# Patient Record
Sex: FEMALE | Race: WHITE | NOT HISPANIC OR LATINO
[De-identification: names, ages, dates, MRNs, and addresses within clinical notes are randomized per-mention and may not be internally consistent; named-entity substitution may affect disease eponyms.]

---

## 2019-09-27 PROBLEM — Z00.00 ENCOUNTER FOR PREVENTIVE HEALTH EXAMINATION: Status: ACTIVE | Noted: 2019-09-27

## 2019-10-24 ENCOUNTER — APPOINTMENT (OUTPATIENT)
Dept: GASTROENTEROLOGY | Facility: CLINIC | Age: 54
End: 2019-10-24
Payer: COMMERCIAL

## 2019-10-24 VITALS
BODY MASS INDEX: 23.03 KG/M2 | HEART RATE: 69 BPM | HEIGHT: 61 IN | SYSTOLIC BLOOD PRESSURE: 117 MMHG | WEIGHT: 122 LBS | DIASTOLIC BLOOD PRESSURE: 75 MMHG

## 2019-10-24 DIAGNOSIS — K62.5 HEMORRHAGE OF ANUS AND RECTUM: ICD-10-CM

## 2019-10-24 DIAGNOSIS — F41.9 ANXIETY DISORDER, UNSPECIFIED: ICD-10-CM

## 2019-10-24 DIAGNOSIS — R30.9 PAINFUL MICTURITION, UNSPECIFIED: ICD-10-CM

## 2019-10-24 DIAGNOSIS — J34.9 UNSPECIFIED DISORDER OF NOSE AND NASAL SINUSES: ICD-10-CM

## 2019-10-24 DIAGNOSIS — K64.9 UNSPECIFIED HEMORRHOIDS: ICD-10-CM

## 2019-10-24 DIAGNOSIS — M25.50 PAIN IN UNSPECIFIED JOINT: ICD-10-CM

## 2019-10-24 DIAGNOSIS — Z12.11 ENCOUNTER FOR SCREENING FOR MALIGNANT NEOPLASM OF COLON: ICD-10-CM

## 2019-10-24 PROCEDURE — 99202 OFFICE O/P NEW SF 15 MIN: CPT

## 2019-10-27 PROBLEM — R30.9 PAINFUL URINATION: Status: ACTIVE | Noted: 2019-10-24

## 2019-10-27 PROBLEM — F41.9 ANXIETY: Status: ACTIVE | Noted: 2019-10-24

## 2019-10-27 PROBLEM — J34.9 SINUS PROBLEM: Status: ACTIVE | Noted: 2019-10-24

## 2019-10-27 PROBLEM — M25.50 JOINT PAIN: Status: ACTIVE | Noted: 2019-10-24

## 2019-10-27 PROBLEM — Z12.11 COLON CANCER SCREENING: Status: ACTIVE | Noted: 2019-10-24

## 2019-10-27 PROBLEM — K62.5 RECTAL BLEEDING: Status: ACTIVE | Noted: 2019-10-24

## 2019-10-27 PROBLEM — K64.9 HEMORRHOIDS, UNSPECIFIED HEMORRHOID TYPE: Status: ACTIVE | Noted: 2019-10-24

## 2019-10-27 NOTE — HISTORY OF PRESENT ILLNESS
[de-identified] : 55yo female presents for initial screening colonoscopy\par Patient presents prior to screening colonoscopy.  Patient notes scant rectal bleeding on paper wth BM sometimes.  She notes having hemorroihds which flare up intermittently.\par

## 2019-10-27 NOTE — REVIEW OF SYSTEMS
[As Noted in HPI] : as noted in HPI [Dysuria] : dysuria [Incontinence] : no incontinence [Pelvic Pain] : no pelvic pain [Dysmenorrhea] : no dysmenorrhea [Vaginal Discharge] : no vaginal discharge [Abn Vaginal Bleeding] : no unexplained vaginal bleeding [Arthralgias] : no arthralgias [Joint Pain] : joint pain [Joint Swelling] : no joint swelling [Joint Stiffness] : no joint stiffness [Limb Pain] : no limb pain [Limb Swelling] : no limb swelling [Negative] : Heme/Lymph

## 2019-10-27 NOTE — PHYSICAL EXAM
[General Appearance - Alert] : alert [General Appearance - In No Acute Distress] : in no acute distress [Sclera] : the sclera and conjunctiva were normal [PERRL With Normal Accommodation] : pupils were equal in size, round, and reactive to light [Extraocular Movements] : extraocular movements were intact [Outer Ear] : the ears and nose were normal in appearance [Oropharynx] : the oropharynx was normal [Neck Appearance] : the appearance of the neck was normal [Neck Cervical Mass (___cm)] : no neck mass was observed [Jugular Venous Distention Increased] : there was no jugular-venous distention [Thyroid Diffuse Enlargement] : the thyroid was not enlarged [Thyroid Nodule] : there were no palpable thyroid nodules [Auscultation Breath Sounds / Voice Sounds] : lungs were clear to auscultation bilaterally [Heart Rate And Rhythm] : heart rate was normal and rhythm regular [Heart Sounds] : normal S1 and S2 [Heart Sounds Gallop] : no gallops [Murmurs] : no murmurs [Heart Sounds Pericardial Friction Rub] : no pericardial rub [Full Pulse] : the pedal pulses are present [Edema] : there was no peripheral edema [Bowel Sounds] : normal bowel sounds [Abdomen Soft] : soft [Abdomen Tenderness] : non-tender [] : no hepato-splenomegaly [Abdomen Mass (___ Cm)] : no abdominal mass palpated [Abnormal Walk] : normal gait [Nail Clubbing] : no clubbing  or cyanosis of the fingernails [Musculoskeletal - Swelling] : no joint swelling seen [Motor Tone] : muscle strength and tone were normal [Oriented To Time, Place, And Person] : oriented to person, place, and time [Impaired Insight] : insight and judgment were intact [Affect] : the affect was normal

## 2019-11-15 ENCOUNTER — RESULT REVIEW (OUTPATIENT)
Age: 54
End: 2019-11-15

## 2019-11-15 ENCOUNTER — APPOINTMENT (OUTPATIENT)
Dept: GASTROENTEROLOGY | Facility: AMBULATORY MEDICAL SERVICES | Age: 54
End: 2019-11-15
Payer: COMMERCIAL

## 2019-11-15 PROCEDURE — 45380 COLONOSCOPY AND BIOPSY: CPT

## 2021-01-01 ENCOUNTER — OUTPATIENT (OUTPATIENT)
Dept: OUTPATIENT SERVICES | Facility: HOSPITAL | Age: 56
LOS: 1 days | End: 2021-01-01
Payer: COMMERCIAL

## 2021-01-01 DIAGNOSIS — Z20.828 CONTACT WITH AND (SUSPECTED) EXPOSURE TO OTHER VIRAL COMMUNICABLE DISEASES: ICD-10-CM

## 2021-01-01 LAB — SARS-COV-2 RNA SPEC QL NAA+PROBE: SIGNIFICANT CHANGE UP

## 2021-01-01 PROCEDURE — U0003: CPT

## 2021-01-01 PROCEDURE — C9803: CPT

## 2021-01-01 PROCEDURE — U0005: CPT

## 2021-01-02 DIAGNOSIS — Z20.828 CONTACT WITH AND (SUSPECTED) EXPOSURE TO OTHER VIRAL COMMUNICABLE DISEASES: ICD-10-CM

## 2021-01-06 ENCOUNTER — OUTPATIENT (OUTPATIENT)
Dept: OUTPATIENT SERVICES | Facility: HOSPITAL | Age: 56
LOS: 1 days | End: 2021-01-06

## 2021-01-06 DIAGNOSIS — Z20.828 CONTACT WITH AND (SUSPECTED) EXPOSURE TO OTHER VIRAL COMMUNICABLE DISEASES: ICD-10-CM

## 2021-01-06 LAB — SARS-COV-2 RNA SPEC QL NAA+PROBE: SIGNIFICANT CHANGE UP

## 2021-01-06 PROCEDURE — U0005: CPT

## 2021-01-06 PROCEDURE — C9803: CPT

## 2021-01-06 PROCEDURE — U0003: CPT

## 2021-01-07 DIAGNOSIS — Z20.828 CONTACT WITH AND (SUSPECTED) EXPOSURE TO OTHER VIRAL COMMUNICABLE DISEASES: ICD-10-CM

## 2021-05-24 DIAGNOSIS — Z01.818 ENCOUNTER FOR OTHER PREPROCEDURAL EXAMINATION: ICD-10-CM

## 2021-05-25 DIAGNOSIS — Z20.822 ENCOUNTER FOR PREPROCEDURAL LABORATORY EXAMINATION: ICD-10-CM

## 2021-05-25 DIAGNOSIS — Z01.812 ENCOUNTER FOR PREPROCEDURAL LABORATORY EXAMINATION: ICD-10-CM

## 2021-05-28 ENCOUNTER — APPOINTMENT (OUTPATIENT)
Dept: DISASTER EMERGENCY | Facility: CLINIC | Age: 56
End: 2021-05-28

## 2021-05-29 LAB — SARS-COV-2 N GENE NPH QL NAA+PROBE: NOT DETECTED

## 2021-06-01 ENCOUNTER — APPOINTMENT (OUTPATIENT)
Dept: INTERNAL MEDICINE | Facility: CLINIC | Age: 56
End: 2021-06-01
Payer: COMMERCIAL

## 2021-06-01 ENCOUNTER — NON-APPOINTMENT (OUTPATIENT)
Age: 56
End: 2021-06-01

## 2021-06-01 VITALS
SYSTOLIC BLOOD PRESSURE: 120 MMHG | RESPIRATION RATE: 18 BRPM | WEIGHT: 147 LBS | OXYGEN SATURATION: 97 % | BODY MASS INDEX: 27.75 KG/M2 | TEMPERATURE: 98.2 F | HEIGHT: 61 IN | DIASTOLIC BLOOD PRESSURE: 70 MMHG | HEART RATE: 81 BPM

## 2021-06-01 DIAGNOSIS — Z87.891 PERSONAL HISTORY OF NICOTINE DEPENDENCE: ICD-10-CM

## 2021-06-01 DIAGNOSIS — J98.11 ATELECTASIS: ICD-10-CM

## 2021-06-01 DIAGNOSIS — R06.00 DYSPNEA, UNSPECIFIED: ICD-10-CM

## 2021-06-01 PROCEDURE — 99205 OFFICE O/P NEW HI 60 MIN: CPT | Mod: 25

## 2021-06-01 PROCEDURE — 94727 GAS DIL/WSHOT DETER LNG VOL: CPT

## 2021-06-01 PROCEDURE — 94010 BREATHING CAPACITY TEST: CPT

## 2021-06-01 PROCEDURE — ZZZZZ: CPT

## 2021-06-01 PROCEDURE — 94729 DIFFUSING CAPACITY: CPT

## 2021-06-01 RX ORDER — UBIDECARENONE/VIT E ACET 100MG-5
CAPSULE ORAL
Refills: 0 | Status: ACTIVE | COMMUNITY

## 2021-06-01 RX ORDER — CHROMIUM 200 MCG
TABLET ORAL
Refills: 0 | Status: ACTIVE | COMMUNITY

## 2021-06-01 RX ORDER — SODIUM SULFATE, POTASSIUM SULFATE, MAGNESIUM SULFATE 17.5; 3.13; 1.6 G/ML; G/ML; G/ML
17.5-3.13-1.6 SOLUTION, CONCENTRATE ORAL
Qty: 1 | Refills: 0 | Status: DISCONTINUED | COMMUNITY
Start: 2019-10-24 | End: 2021-06-01

## 2021-06-01 NOTE — PROCEDURE
[FreeTextEntry1] : Complete pulmonary function tests show normal spirometry. FEV1 is 2.34 L which is 99% predicted. FVC is 3.20 L which is 100% predicted. FEV1/FC ratio 73%. Diffusing capacity is normal.

## 2021-06-01 NOTE — DISCUSSION/SUMMARY
[FreeTextEntry1] : Ms. Black is a 56-year-old female presents for initial pulmonary evaluation. She is complaining of shortness of breath when going up stairs or up a hill. I've explained this is most likely deconditioning of the "antigravity" muscle groups. She will continue to exercise on a regular basis. CT scan of the chest showed show some minor linear atelectasis bilaterally at the bases. There were no masses noted. Complete pulmonary function tests are normal. Patient does not require further radiologic followup. Ms. Black will follow up in this office on an as-needed basis.

## 2021-06-01 NOTE — HISTORY OF PRESENT ILLNESS
[TextBox_4] : Ms. Black is a 65-year-old female who presents for initial pulmonary evaluation. She is complaining of shortness of breath when going up stairs or going up an incline. Patient states that when she walks up a flight of stairs walks uphill she developed some shortness of breath. Symptoms do resolve with rest. She is able to engage in exercise and physical activity on a flat surface with no significant symptoms of shortness of breath. Ms. Black has no previous history of asthma or seasonal allergic rhinitis. She is a former smoker With a 20+ pack year history. The patient was sent for a CT scan of his chest by her primary care doctor which showed linear atelectasis at the lung bases bilaterally.

## 2021-07-29 ENCOUNTER — APPOINTMENT (OUTPATIENT)
Dept: NEUROLOGY | Facility: CLINIC | Age: 56
End: 2021-07-29

## 2021-07-29 ENCOUNTER — APPOINTMENT (OUTPATIENT)
Dept: NEUROLOGY | Facility: CLINIC | Age: 56
End: 2021-07-29
Payer: COMMERCIAL

## 2021-07-29 VITALS
DIASTOLIC BLOOD PRESSURE: 80 MMHG | TEMPERATURE: 97.3 F | BODY MASS INDEX: 25.3 KG/M2 | WEIGHT: 134 LBS | SYSTOLIC BLOOD PRESSURE: 120 MMHG | HEIGHT: 61 IN

## 2021-07-29 DIAGNOSIS — M79.606 PAIN IN LEG, UNSPECIFIED: ICD-10-CM

## 2021-07-29 DIAGNOSIS — R27.0 ATAXIA, UNSPECIFIED: ICD-10-CM

## 2021-07-29 PROCEDURE — 99204 OFFICE O/P NEW MOD 45 MIN: CPT

## 2021-08-18 ENCOUNTER — APPOINTMENT (OUTPATIENT)
Dept: NEUROLOGY | Facility: CLINIC | Age: 56
End: 2021-08-18

## 2021-08-25 ENCOUNTER — NON-APPOINTMENT (OUTPATIENT)
Age: 56
End: 2021-08-25

## 2021-10-29 ENCOUNTER — APPOINTMENT (OUTPATIENT)
Dept: UROLOGY | Facility: CLINIC | Age: 56
End: 2021-10-29
Payer: COMMERCIAL

## 2021-10-29 VITALS
SYSTOLIC BLOOD PRESSURE: 100 MMHG | BODY MASS INDEX: 25.3 KG/M2 | OXYGEN SATURATION: 95 % | TEMPERATURE: 97.3 F | RESPIRATION RATE: 17 BRPM | HEART RATE: 79 BPM | HEIGHT: 61 IN | WEIGHT: 134 LBS | DIASTOLIC BLOOD PRESSURE: 66 MMHG

## 2021-10-29 PROCEDURE — 99204 OFFICE O/P NEW MOD 45 MIN: CPT

## 2021-10-29 RX ORDER — TAMSULOSIN HYDROCHLORIDE 0.4 MG/1
0.4 CAPSULE ORAL
Qty: 30 | Refills: 0 | Status: ACTIVE | COMMUNITY
Start: 2021-10-29 | End: 1900-01-01

## 2021-10-29 NOTE — REVIEW OF SYSTEMS
[Eyesight Problems] : eyesight problems [Dry Eyes] : dryness of the eyes [Presently in menopause ___] : presently in menopause [unfilled] [Told you have blood in urine on a urine test] : told blood was present in a urine test [Wake up at night to urinate  How many times?  ___] : wakes up to urinate [unfilled] times during the night [Wait a long time to urinate] : waits a long time to urinate [Joint Pain] : joint pain [Anxiety] : anxiety [Muscle Weakness] : muscle weakness [Negative] : Heme/Lymph

## 2021-10-29 NOTE — ASSESSMENT
[FreeTextEntry1] : Ms. West is a 56 y.o. F who presents with a left distal ureteral stone.  She has no signs or symptoms of infection, and her pain is currently well controlled.  We discussed the management of ureteral stones.  We discussed that typically, we allow patients 4 to 6 weeks to attempt to pass their stones.  This involves medical treatment with tamsulosin, adequate hydration, and pain control.  Discussed that if she experiences a fever, intractable pain, or intolerable nausea or vomiting, she should present to the ER.  We discussed that in the event she does not pass her stone, we would go to the operating room for left ureteroscopy with laser lithotripsy.  The procedure, including the risk and benefits were discussed and she agreed to proceed.\par - Tamsulosin prescribed. Strainer given\par - Will book for left ureteroscopy / laser lithotripsy in ~4 weeks. Asked her to call and cancel if she passes her stone. Will have her undergo a KUB ~1 week before scheduled surgery if still in pain (stone visible on  KUB). \par - UA, UCx today\par - Discussed warning signs to present to the ED, including fever, chills, intractable nausea, intolerable pain.

## 2021-10-29 NOTE — PHYSICAL EXAM
[General Appearance - Well Developed] : well developed [Heart Rate And Rhythm] : Heart rate and rhythm were normal [] : no respiratory distress [Bowel Sounds] : normal bowel sounds [Normal Station and Gait] : the gait and station were normal for the patient's age [Skin Color & Pigmentation] : normal skin color and pigmentation [No Focal Deficits] : no focal deficits [Oriented To Time, Place, And Person] : oriented to person, place, and time [FreeTextEntry1] : mild left CVAT

## 2021-10-29 NOTE — HISTORY OF PRESENT ILLNESS
[FreeTextEntry1] : BELLA MUÑOZ is a 56 year F who presents today as a new patient evaluation for left UVJ stone.\par \par Starting 2 weeks ago, she noticed acute onset left-sided flank pain.  This resolved spontaneously.  She again had an episode of pain 5 days ago, and then her worst pain was approximately 2 days ago.  She called her primary care physician, who had her undergo a CT scan, which demonstrated a 6 mm left UVJ stone.  She presents today due to this finding.  This is her first stone.  She denies any fever, chills, nausea, emesis.  She is still having pain, but this is currently a 2 out of 10.  She is denies dysuria, increased frequency or urgency.

## 2021-10-29 NOTE — LETTER BODY
[Dear  ___] : Dear  [unfilled], [Consult Letter:] : I had the pleasure of evaluating your patient, [unfilled]. [Please see my note below.] : Please see my note below. [Consult Closing:] : Thank you very much for allowing me to participate in the care of this patient.  If you have any questions, please do not hesitate to contact me. [Sincerely,] : Sincerely, [FreeTextEntry2] : Ruel Trujillo\par [address] [place], [place]\par [address] [FreeTextEntry3] : Mitch Peter

## 2021-10-31 LAB
APPEARANCE: CLEAR
BACTERIA UR CULT: NORMAL
BACTERIA: NEGATIVE
BILIRUBIN URINE: NEGATIVE
BLOOD URINE: NORMAL
COLOR: COLORLESS
GLUCOSE QUALITATIVE U: NEGATIVE
HYALINE CASTS: 0 /LPF
KETONES URINE: NEGATIVE
LEUKOCYTE ESTERASE URINE: NEGATIVE
MICROSCOPIC-UA: NORMAL
NITRITE URINE: NEGATIVE
PH URINE: 6
PROTEIN URINE: NEGATIVE
RED BLOOD CELLS URINE: 2 /HPF
SPECIFIC GRAVITY URINE: 1.01
SQUAMOUS EPITHELIAL CELLS: 1 /HPF
UROBILINOGEN URINE: NORMAL
WHITE BLOOD CELLS URINE: 0 /HPF

## 2021-11-01 ENCOUNTER — TRANSCRIPTION ENCOUNTER (OUTPATIENT)
Age: 56
End: 2021-11-01

## 2021-11-15 ENCOUNTER — OUTPATIENT (OUTPATIENT)
Dept: OUTPATIENT SERVICES | Facility: HOSPITAL | Age: 56
LOS: 1 days | End: 2021-11-15
Payer: COMMERCIAL

## 2021-11-15 VITALS
OXYGEN SATURATION: 97 % | SYSTOLIC BLOOD PRESSURE: 104 MMHG | HEIGHT: 61 IN | TEMPERATURE: 98 F | HEART RATE: 64 BPM | DIASTOLIC BLOOD PRESSURE: 70 MMHG | RESPIRATION RATE: 20 BRPM | WEIGHT: 123.9 LBS

## 2021-11-15 DIAGNOSIS — N20.1 CALCULUS OF URETER: ICD-10-CM

## 2021-11-15 DIAGNOSIS — Z98.890 OTHER SPECIFIED POSTPROCEDURAL STATES: Chronic | ICD-10-CM

## 2021-11-15 DIAGNOSIS — Z01.818 ENCOUNTER FOR OTHER PREPROCEDURAL EXAMINATION: ICD-10-CM

## 2021-11-15 LAB
ANION GAP SERPL CALC-SCNC: 12 MMOL/L — SIGNIFICANT CHANGE UP (ref 5–17)
BUN SERPL-MCNC: 12 MG/DL — SIGNIFICANT CHANGE UP (ref 7–23)
CALCIUM SERPL-MCNC: 9.9 MG/DL — SIGNIFICANT CHANGE UP (ref 8.4–10.5)
CHLORIDE SERPL-SCNC: 104 MMOL/L — SIGNIFICANT CHANGE UP (ref 96–108)
CO2 SERPL-SCNC: 24 MMOL/L — SIGNIFICANT CHANGE UP (ref 22–31)
CREAT SERPL-MCNC: 0.76 MG/DL — SIGNIFICANT CHANGE UP (ref 0.5–1.3)
GLUCOSE SERPL-MCNC: 101 MG/DL — HIGH (ref 70–99)
HCT VFR BLD CALC: 39.4 % — SIGNIFICANT CHANGE UP (ref 34.5–45)
HGB BLD-MCNC: 12.6 G/DL — SIGNIFICANT CHANGE UP (ref 11.5–15.5)
MCHC RBC-ENTMCNC: 30.4 PG — SIGNIFICANT CHANGE UP (ref 27–34)
MCHC RBC-ENTMCNC: 32 GM/DL — SIGNIFICANT CHANGE UP (ref 32–36)
MCV RBC AUTO: 94.9 FL — SIGNIFICANT CHANGE UP (ref 80–100)
NRBC # BLD: 0 /100 WBCS — SIGNIFICANT CHANGE UP (ref 0–0)
PLATELET # BLD AUTO: 305 K/UL — SIGNIFICANT CHANGE UP (ref 150–400)
POTASSIUM SERPL-MCNC: 4 MMOL/L — SIGNIFICANT CHANGE UP (ref 3.5–5.3)
POTASSIUM SERPL-SCNC: 4 MMOL/L — SIGNIFICANT CHANGE UP (ref 3.5–5.3)
RBC # BLD: 4.15 M/UL — SIGNIFICANT CHANGE UP (ref 3.8–5.2)
RBC # FLD: 13.1 % — SIGNIFICANT CHANGE UP (ref 10.3–14.5)
SODIUM SERPL-SCNC: 140 MMOL/L — SIGNIFICANT CHANGE UP (ref 135–145)
WBC # BLD: 5.13 K/UL — SIGNIFICANT CHANGE UP (ref 3.8–10.5)
WBC # FLD AUTO: 5.13 K/UL — SIGNIFICANT CHANGE UP (ref 3.8–10.5)

## 2021-11-15 PROCEDURE — 80048 BASIC METABOLIC PNL TOTAL CA: CPT

## 2021-11-15 PROCEDURE — G0463: CPT

## 2021-11-15 PROCEDURE — 85027 COMPLETE CBC AUTOMATED: CPT

## 2021-11-15 RX ORDER — LIDOCAINE HCL 20 MG/ML
0.2 VIAL (ML) INJECTION ONCE
Refills: 0 | Status: DISCONTINUED | OUTPATIENT
Start: 2021-11-29 | End: 2021-12-13

## 2021-11-15 RX ORDER — SODIUM CHLORIDE 9 MG/ML
3 INJECTION INTRAMUSCULAR; INTRAVENOUS; SUBCUTANEOUS EVERY 8 HOURS
Refills: 0 | Status: DISCONTINUED | OUTPATIENT
Start: 2021-11-29 | End: 2021-12-13

## 2021-11-15 NOTE — H&P PST ADULT - HISTORY OF PRESENT ILLNESS
56 yr old female with history of Anxiety , Lumbar disc disorder with  Left flank pain in October 2021, had CT scan - revealed left UVJ stone. Coming in for Left ureteroscopy, Laser lithotripsy Stent placement on 11/29/2021.     Denies Recent travel, Exposure or Covid symptoms  Covid test- 11/26/2021  Covid vaccine 3doses - last dose-11/2021

## 2021-11-15 NOTE — H&P PST ADULT - ENMT
Is patient still requesting refills of Tramadol and Metaxalone?     negative No oral lesions; no gross abnormalities

## 2021-11-15 NOTE — H&P PST ADULT - NSANTHOSAYNRD_GEN_A_CORE
No. JESI screening performed.  STOP BANG Legend: 0-2 = LOW Risk; 3-4 = INTERMEDIATE Risk; 5-8 = HIGH Risk

## 2021-11-17 PROBLEM — M51.9 UNSPECIFIED THORACIC, THORACOLUMBAR AND LUMBOSACRAL INTERVERTEBRAL DISC DISORDER: Chronic | Status: ACTIVE | Noted: 2021-11-15

## 2021-11-17 PROBLEM — F41.9 ANXIETY DISORDER, UNSPECIFIED: Chronic | Status: ACTIVE | Noted: 2021-11-15

## 2021-11-17 PROBLEM — E04.1 NONTOXIC SINGLE THYROID NODULE: Chronic | Status: ACTIVE | Noted: 2021-11-15

## 2021-11-19 ENCOUNTER — TRANSCRIPTION ENCOUNTER (OUTPATIENT)
Age: 56
End: 2021-11-19

## 2021-11-22 ENCOUNTER — TRANSCRIPTION ENCOUNTER (OUTPATIENT)
Age: 56
End: 2021-11-22

## 2021-11-24 ENCOUNTER — APPOINTMENT (OUTPATIENT)
Dept: RADIOLOGY | Facility: CLINIC | Age: 56
End: 2021-11-24
Payer: COMMERCIAL

## 2021-11-24 ENCOUNTER — OUTPATIENT (OUTPATIENT)
Dept: OUTPATIENT SERVICES | Facility: HOSPITAL | Age: 56
LOS: 1 days | End: 2021-11-24
Payer: COMMERCIAL

## 2021-11-24 DIAGNOSIS — N20.1 CALCULUS OF URETER: ICD-10-CM

## 2021-11-24 DIAGNOSIS — Z98.890 OTHER SPECIFIED POSTPROCEDURAL STATES: Chronic | ICD-10-CM

## 2021-11-24 PROCEDURE — 74018 RADEX ABDOMEN 1 VIEW: CPT

## 2021-11-24 PROCEDURE — 74018 RADEX ABDOMEN 1 VIEW: CPT | Mod: 26

## 2021-11-26 ENCOUNTER — OUTPATIENT (OUTPATIENT)
Dept: OUTPATIENT SERVICES | Facility: HOSPITAL | Age: 56
LOS: 1 days | End: 2021-11-26
Payer: COMMERCIAL

## 2021-11-26 ENCOUNTER — APPOINTMENT (OUTPATIENT)
Dept: DISASTER EMERGENCY | Facility: CLINIC | Age: 56
End: 2021-11-26

## 2021-11-26 DIAGNOSIS — Z98.890 OTHER SPECIFIED POSTPROCEDURAL STATES: Chronic | ICD-10-CM

## 2021-11-26 DIAGNOSIS — Z11.52 ENCOUNTER FOR SCREENING FOR COVID-19: ICD-10-CM

## 2021-11-26 LAB — SARS-COV-2 RNA SPEC QL NAA+PROBE: SIGNIFICANT CHANGE UP

## 2021-11-26 PROCEDURE — C9803: CPT

## 2021-11-26 PROCEDURE — U0003: CPT

## 2021-11-26 PROCEDURE — U0005: CPT

## 2021-11-28 ENCOUNTER — TRANSCRIPTION ENCOUNTER (OUTPATIENT)
Age: 56
End: 2021-11-28

## 2021-11-29 ENCOUNTER — RESULT REVIEW (OUTPATIENT)
Age: 56
End: 2021-11-29

## 2021-11-29 ENCOUNTER — APPOINTMENT (OUTPATIENT)
Dept: UROLOGY | Facility: HOSPITAL | Age: 56
End: 2021-11-29

## 2021-11-29 ENCOUNTER — OUTPATIENT (OUTPATIENT)
Dept: OUTPATIENT SERVICES | Facility: HOSPITAL | Age: 56
LOS: 1 days | End: 2021-11-29
Payer: COMMERCIAL

## 2021-11-29 VITALS
HEART RATE: 74 BPM | DIASTOLIC BLOOD PRESSURE: 67 MMHG | OXYGEN SATURATION: 100 % | TEMPERATURE: 97 F | RESPIRATION RATE: 16 BRPM | SYSTOLIC BLOOD PRESSURE: 118 MMHG

## 2021-11-29 VITALS
OXYGEN SATURATION: 100 % | DIASTOLIC BLOOD PRESSURE: 77 MMHG | HEART RATE: 59 BPM | HEIGHT: 61 IN | TEMPERATURE: 98 F | RESPIRATION RATE: 15 BRPM | SYSTOLIC BLOOD PRESSURE: 120 MMHG | WEIGHT: 123.9 LBS

## 2021-11-29 DIAGNOSIS — Z98.890 OTHER SPECIFIED POSTPROCEDURAL STATES: Chronic | ICD-10-CM

## 2021-11-29 DIAGNOSIS — N20.1 CALCULUS OF URETER: ICD-10-CM

## 2021-11-29 PROCEDURE — 52332 CYSTOSCOPY AND TREATMENT: CPT | Mod: LT

## 2021-11-29 PROCEDURE — 88300 SURGICAL PATH GROSS: CPT

## 2021-11-29 PROCEDURE — C2617: CPT

## 2021-11-29 PROCEDURE — C1726: CPT

## 2021-11-29 PROCEDURE — 88300 SURGICAL PATH GROSS: CPT | Mod: 26

## 2021-11-29 PROCEDURE — 82365 CALCULUS SPECTROSCOPY: CPT

## 2021-11-29 PROCEDURE — C1889: CPT

## 2021-11-29 PROCEDURE — C1758: CPT

## 2021-11-29 PROCEDURE — 52352 CYSTOURETERO W/STONE REMOVE: CPT | Mod: LT

## 2021-11-29 PROCEDURE — C1769: CPT

## 2021-11-29 PROCEDURE — 76000 FLUOROSCOPY <1 HR PHYS/QHP: CPT

## 2021-11-29 RX ORDER — OXYCODONE HYDROCHLORIDE 5 MG/1
5 TABLET ORAL ONCE
Refills: 0 | Status: DISCONTINUED | OUTPATIENT
Start: 2021-11-29 | End: 2021-11-29

## 2021-11-29 RX ORDER — CHOLECALCIFEROL (VITAMIN D3) 125 MCG
1 CAPSULE ORAL
Qty: 0 | Refills: 0 | DISCHARGE

## 2021-11-29 RX ORDER — PREGABALIN 225 MG/1
2500 CAPSULE ORAL
Qty: 0 | Refills: 0 | DISCHARGE

## 2021-11-29 RX ORDER — ESCITALOPRAM OXALATE 10 MG/1
1 TABLET, FILM COATED ORAL
Qty: 0 | Refills: 0 | DISCHARGE

## 2021-11-29 RX ORDER — ONDANSETRON 8 MG/1
4 TABLET, FILM COATED ORAL ONCE
Refills: 0 | Status: DISCONTINUED | OUTPATIENT
Start: 2021-11-29 | End: 2021-12-13

## 2021-11-29 RX ORDER — SODIUM CHLORIDE 9 MG/ML
1000 INJECTION, SOLUTION INTRAVENOUS
Refills: 0 | Status: DISCONTINUED | OUTPATIENT
Start: 2021-11-29 | End: 2021-12-13

## 2021-11-29 RX ORDER — FOLIC ACID 0.8 MG
1 TABLET ORAL
Qty: 0 | Refills: 0 | DISCHARGE

## 2021-11-29 RX ORDER — CEFAZOLIN SODIUM 1 G
2000 VIAL (EA) INJECTION ONCE
Refills: 0 | Status: COMPLETED | OUTPATIENT
Start: 2021-11-29 | End: 2021-11-29

## 2021-11-29 NOTE — ASU DISCHARGE PLAN (ADULT/PEDIATRIC) - CARE PROVIDER_API CALL
Mitch Peter)  Urology  [phone] [place], [place] [address]  Phone: [phone]  Fax: [phone]  Follow Up Time: 2 weeks

## 2021-11-29 NOTE — ASU DISCHARGE PLAN (ADULT/PEDIATRIC) - ASU DC SPECIAL INSTRUCTIONSFT
You may take Tylenol and Ibuprofen over the counter every 6 hours for pain.     You may have intermittent pink tinged urine and slight flank pain when you urinate.  This is normal and due to the stent in your ureter.   If your urine becomes bright red or with clots, please call the office.     Please follow up with Dr Peter in 2 weeks. Please call the office to schedule your appointment.

## 2021-11-29 NOTE — BRIEF OPERATIVE NOTE - NSICDXBRIEFPROCEDURE_GEN_ALL_CORE_FT
PROCEDURES:  Cystoscopy with left ureteroscopy 29-Nov-2021 14:26:46 stone extraction, stent placement Luiz Clement

## 2021-12-01 LAB — NIDUS STONE QN: SIGNIFICANT CHANGE UP

## 2021-12-04 ENCOUNTER — NON-APPOINTMENT (OUTPATIENT)
Age: 56
End: 2021-12-04

## 2021-12-07 LAB — SURGICAL PATHOLOGY STUDY: SIGNIFICANT CHANGE UP

## 2021-12-14 ENCOUNTER — APPOINTMENT (OUTPATIENT)
Dept: UROLOGY | Facility: CLINIC | Age: 56
End: 2021-12-14
Payer: COMMERCIAL

## 2021-12-14 VITALS — SYSTOLIC BLOOD PRESSURE: 110 MMHG | DIASTOLIC BLOOD PRESSURE: 70 MMHG

## 2021-12-14 PROCEDURE — 52310 CYSTOSCOPY AND TREATMENT: CPT

## 2022-01-20 ENCOUNTER — OUTPATIENT (OUTPATIENT)
Dept: OUTPATIENT SERVICES | Facility: HOSPITAL | Age: 57
LOS: 1 days | End: 2022-01-20
Payer: COMMERCIAL

## 2022-01-20 ENCOUNTER — APPOINTMENT (OUTPATIENT)
Dept: ULTRASOUND IMAGING | Facility: CLINIC | Age: 57
End: 2022-01-20
Payer: COMMERCIAL

## 2022-01-20 DIAGNOSIS — Z98.890 OTHER SPECIFIED POSTPROCEDURAL STATES: Chronic | ICD-10-CM

## 2022-01-20 DIAGNOSIS — N20.1 CALCULUS OF URETER: ICD-10-CM

## 2022-01-20 PROCEDURE — 76775 US EXAM ABDO BACK WALL LIM: CPT | Mod: 26

## 2022-01-20 PROCEDURE — 76775 US EXAM ABDO BACK WALL LIM: CPT

## 2022-01-25 ENCOUNTER — APPOINTMENT (OUTPATIENT)
Dept: UROLOGY | Facility: CLINIC | Age: 57
End: 2022-01-25
Payer: COMMERCIAL

## 2022-01-25 VITALS
OXYGEN SATURATION: 94 % | TEMPERATURE: 96.5 F | BODY MASS INDEX: 23.79 KG/M2 | HEART RATE: 69 BPM | WEIGHT: 126 LBS | DIASTOLIC BLOOD PRESSURE: 75 MMHG | HEIGHT: 61 IN | SYSTOLIC BLOOD PRESSURE: 112 MMHG | RESPIRATION RATE: 14 BRPM

## 2022-01-25 PROCEDURE — 99213 OFFICE O/P EST LOW 20 MIN: CPT

## 2022-01-26 NOTE — PHYSICAL EXAM
[General Appearance - Well Developed] : well developed [General Appearance - Well Nourished] : well nourished [Bowel Sounds] : normal bowel sounds [Abdomen Hernia] : no hernia was discovered [Costovertebral Angle Tenderness] : no ~M costovertebral angle tenderness [Heart Rate And Rhythm] : Heart rate and rhythm were normal [] : no respiratory distress

## 2022-01-26 NOTE — HISTORY OF PRESENT ILLNESS
[FreeTextEntry1] : Ms. Serrano is a very pleasant 56-year-old female who is status post left ureteroscopy with laser lithotripsy by me on November 29, 2021 for a 6 mm left UVJ stone.  During the case, she was found to also have a distal ureteral stricture, which was dilated with the balloon.  The stone was extracted, and the stent was left in place for 2 weeks.  She returns today.\par \par Stone analysis: 90% calcium oxalate monohydrate, 10% calcium phosphate\par \par She is doing well.  She denies fever, chills, nausea, emesis, gross hematuria, flank pain.  This is the first stone, but she is very interested in preventing future stones.

## 2022-01-26 NOTE — ASSESSMENT
[FreeTextEntry1] : Ms Serrano is a 56 y.o. F who is s/p LEFT ureteroscopy with laser lithotripsy, and balloon dilation of a distal ureteral stricture by me on November 29, 2021 for a 6 mm left UVJ stone. She is doing well. Follow-up renal US demonstrates resolution of hydronephrosis and absence of stones. She is interested in initiating a metabolic work-up for stone prevention.\par -Comprehensive metabolic panel, PTH, uric acid\par -24-hour urine\par -Discussed general stone preventative guidelines, including increasing water intake, low-sodium diet, high intake of fruits vegetables, low intake of nondairy animal protein.\par -TTM 4 weeks Nausea

## 2022-01-31 LAB
ALBUMIN SERPL ELPH-MCNC: 4.8 G/DL
ALP BLD-CCNC: 91 U/L
ALT SERPL-CCNC: 29 U/L
ANION GAP SERPL CALC-SCNC: 12 MMOL/L
AST SERPL-CCNC: 27 U/L
BILIRUB SERPL-MCNC: 0.2 MG/DL
BUN SERPL-MCNC: 13 MG/DL
CALCIUM SERPL-MCNC: 10.2 MG/DL
CALCIUM SERPL-MCNC: 10.2 MG/DL
CHLORIDE SERPL-SCNC: 105 MMOL/L
CO2 SERPL-SCNC: 25 MMOL/L
CREAT SERPL-MCNC: 0.73 MG/DL
GLUCOSE SERPL-MCNC: 99 MG/DL
PARATHYROID HORMONE INTACT: 25 PG/ML
POTASSIUM SERPL-SCNC: 4.9 MMOL/L
PROT SERPL-MCNC: 7.1 G/DL
SODIUM SERPL-SCNC: 143 MMOL/L
URATE SERPL-MCNC: 2.8 MG/DL

## 2022-02-22 ENCOUNTER — APPOINTMENT (OUTPATIENT)
Dept: UROLOGY | Facility: CLINIC | Age: 57
End: 2022-02-22
Payer: COMMERCIAL

## 2022-02-22 PROCEDURE — 99441: CPT

## 2022-04-07 ENCOUNTER — APPOINTMENT (OUTPATIENT)
Dept: UROLOGY | Facility: CLINIC | Age: 57
End: 2022-04-07
Payer: COMMERCIAL

## 2022-04-07 PROCEDURE — 99441: CPT

## 2022-05-09 ENCOUNTER — NON-APPOINTMENT (OUTPATIENT)
Age: 57
End: 2022-05-09

## 2022-07-06 ENCOUNTER — APPOINTMENT (OUTPATIENT)
Dept: ULTRASOUND IMAGING | Facility: CLINIC | Age: 57
End: 2022-07-06

## 2022-07-06 ENCOUNTER — OUTPATIENT (OUTPATIENT)
Dept: OUTPATIENT SERVICES | Facility: HOSPITAL | Age: 57
LOS: 1 days | End: 2022-07-06
Payer: COMMERCIAL

## 2022-07-06 DIAGNOSIS — E83.59 OTHER DISORDERS OF CALCIUM METABOLISM: ICD-10-CM

## 2022-07-06 DIAGNOSIS — Z98.890 OTHER SPECIFIED POSTPROCEDURAL STATES: Chronic | ICD-10-CM

## 2022-07-06 PROCEDURE — 76775 US EXAM ABDO BACK WALL LIM: CPT | Mod: 26

## 2022-07-06 PROCEDURE — 76775 US EXAM ABDO BACK WALL LIM: CPT

## 2022-11-22 ENCOUNTER — APPOINTMENT (OUTPATIENT)
Dept: MRI IMAGING | Facility: CLINIC | Age: 57
End: 2022-11-22

## 2023-01-12 ENCOUNTER — APPOINTMENT (OUTPATIENT)
Dept: UROLOGY | Facility: CLINIC | Age: 58
End: 2023-01-12

## 2023-03-08 ENCOUNTER — APPOINTMENT (OUTPATIENT)
Dept: UROLOGY | Facility: CLINIC | Age: 58
End: 2023-03-08
Payer: COMMERCIAL

## 2023-03-08 VITALS
OXYGEN SATURATION: 98 % | DIASTOLIC BLOOD PRESSURE: 82 MMHG | SYSTOLIC BLOOD PRESSURE: 128 MMHG | TEMPERATURE: 97.5 F | HEART RATE: 63 BPM

## 2023-03-08 DIAGNOSIS — E83.59 OTHER DISORDERS OF CALCIUM METABOLISM: ICD-10-CM

## 2023-03-08 DIAGNOSIS — N20.1 CALCULUS OF URETER: ICD-10-CM

## 2023-03-08 DIAGNOSIS — R35.0 FREQUENCY OF MICTURITION: ICD-10-CM

## 2023-03-08 PROCEDURE — 99214 OFFICE O/P EST MOD 30 MIN: CPT

## 2023-03-08 NOTE — HISTORY OF PRESENT ILLNESS
[FreeTextEntry1] : To review, she is a 58 y.o. F who presents for:\par \par #Stones\par She is s/p LEFT ureteroscopy with laser lithotripsy and balloon dilation of a distal ureteral stricture by me on November 29, 2021 for a 6 mm left UVJ stone. Stone 90% CaOx monohydrate, 10% CaPhos. Follow-up renal US demonstrated resolution of hydronephrosis and absence of stones. \par CMP / PTH / uric acid negative\par No flank pain, fevers, chills, nausea, emesis\par 24 hour urine with volume 0.92 L, SS CaOx 10.56. UCalcium / UOxalate / UCitrate wnl. \par No current flank pain / GH\par \par #LUTS\par Reports occasional urinary frequency and urgency.  No incontinence.  No dysuria.\par No leakage with coughing / sneezing\par

## 2023-03-08 NOTE — PHYSICAL EXAM
[Normal Appearance] : normal appearance [Abdomen Tenderness] : non-tender [Costovertebral Angle Tenderness] : no ~M costovertebral angle tenderness [] : no rash [Heart Rate And Rhythm] : Heart rate and rhythm were normal

## 2023-03-08 NOTE — ASSESSMENT
[FreeTextEntry1] : 58 y.o F with:\par \par #OAB symptoms\par We discussed the following options for managing the patient's lower urinary tract symptoms (LUTS) attributed to overactive bladder (OAB)\par (1) Behavioral modification: timed and double voiding, reduced oral fluid intake at night, avoid bladder irritants (caffeine, alcohol, smoking, spicy foods), bladder training\par (2) Medical therapy: Anticholinergics or B-3 agonists. Discussed mechanisms of action and side effect profile\par (3) Procedures: Botox, PTNS, interstim\par - PVR low\par - UA\par - Conservative measures as above. Not interested in medications\par \par #History of stones\par - s/p LEFT URS / LL / balloon dilation of stricture\par - Renal US 7/2022 without stone, hydronephrosis\par - RV 6  months with NOEMY prior\par - Generalized stone prevention counseling was provided as follows: \par  \par 1. High fluid intake. The goal should be to drink enough to produce 2.5 liters (quarts) of urine daily. Most studies have shown that high fluid volume intake will decrease the risk of stone formation. Research generally indicates that there appears to be little difference between hard and soft water in the formation of kidney stones. Lemon juice added to the water may also aid with increasing urine pH, urine citric acid and reducing stone formation. \par  \par 2. Dietary recommendations. The key to all dietary recommendations is moderation. \par  \par · Decrease animal protein consumption. High protein intake increases urinary calcium, oxalate, and uric acid excretion into the urine - all of which will increase the probability of stone formation. \par · Decrease sodium intake by avoiding heavily salted foods, and resist adding salt to food at the table. Sodium restriction is widely recognized as an important element of dietary prevention of recurrent kidney stones. \par · Dietary calcium. Patient should consume a daily recommended allowance of dietary calcium (800-1200 mg). Patients who take in too much Ca or too little Ca are at an increased risk of recurrent stone formation. Dietary calcium is preferable to supplements. Calcium supplementation can be safe when the calcium is taken with meals, rather than at bedtime. Another suggestion for patients who have been recommended to take calcium supplements for their bone health is to consider using calcium citrate, an over-the-counter preparation that provides 950 mg of calcium citrate and 200 mg of elemental calcium in each tablet. \par · Avoid excess intake of foods with high oxalate content, including dark leafy greens, beets, nuts, tea, coffee, and chocolate. Strict avoidance of oxalate is not necessary in most cases. \par · Avoid high doses of vitamin C. Intake should be limited to a maximum daily dose of less than 2 gm (2,000 mg).

## 2023-03-09 LAB
APPEARANCE: CLEAR
BACTERIA: NEGATIVE
BILIRUBIN URINE: NEGATIVE
BLOOD URINE: NEGATIVE
COLOR: NORMAL
GLUCOSE QUALITATIVE U: NEGATIVE
HYALINE CASTS: 1 /LPF
KETONES URINE: NEGATIVE
LEUKOCYTE ESTERASE URINE: NEGATIVE
MICROSCOPIC-UA: NORMAL
NITRITE URINE: NEGATIVE
PH URINE: 7
PROTEIN URINE: NEGATIVE
RED BLOOD CELLS URINE: 1 /HPF
SPECIFIC GRAVITY URINE: 1.01
SQUAMOUS EPITHELIAL CELLS: 4 /HPF
UROBILINOGEN URINE: NORMAL
WHITE BLOOD CELLS URINE: 2 /HPF

## 2023-03-23 NOTE — ASU PREOP CHECKLIST - HEIGHT IN INCHES
1
You will be contacted at the number you provided with the results of your strep and viral swabs.  Take tylenol as needed for pain, 650Mg every 6-8 hours.  You can also take ibuprofen as needed for pain, 600mg every 6-8 hours, take with food.  Please return for any new or worsening symptoms

## 2023-09-19 ENCOUNTER — APPOINTMENT (OUTPATIENT)
Dept: UROLOGY | Facility: CLINIC | Age: 58
End: 2023-09-19

## 2024-09-19 ENCOUNTER — APPOINTMENT (OUTPATIENT)
Dept: GASTROENTEROLOGY | Facility: CLINIC | Age: 59
End: 2024-09-19
Payer: COMMERCIAL

## 2024-09-19 VITALS
WEIGHT: 152 LBS | BODY MASS INDEX: 28.7 KG/M2 | SYSTOLIC BLOOD PRESSURE: 128 MMHG | HEIGHT: 61 IN | DIASTOLIC BLOOD PRESSURE: 80 MMHG

## 2024-09-19 DIAGNOSIS — Z86.010 PERSONAL HISTORY OF COLONIC POLYPS: ICD-10-CM

## 2024-09-19 PROCEDURE — 99203 OFFICE O/P NEW LOW 30 MIN: CPT

## 2024-09-19 RX ORDER — ESCITALOPRAM OXALATE 5 MG/1
TABLET, FILM COATED ORAL
Refills: 0 | Status: ACTIVE | COMMUNITY

## 2024-09-19 NOTE — HISTORY OF PRESENT ILLNESS
[FreeTextEntry1] : 60yo female with hx colon polyps  Patient with hx colon polyps on prior colonoscopy and due for surveillance colonoscopy Patient is asymptomatic without bleeding or change in bowel habits

## 2024-09-19 NOTE — ASSESSMENT
[FreeTextEntry1] : 58yo female with hx colon polyps  Will check colonoscopy with suprep Risks and benefits of procedure(s) discussed with patient in detail, including but not limited to, perforation, bleeding, reaction to anesthesia, missed lesions.

## 2024-09-23 RX ORDER — SODIUM SULFATE, POTASSIUM SULFATE AND MAGNESIUM SULFATE 1.6; 3.13; 17.5 G/177ML; G/177ML; G/177ML
17.5-3.13-1.6 SOLUTION ORAL
Qty: 1 | Refills: 0 | Status: ACTIVE | COMMUNITY
Start: 2024-09-19 | End: 1900-01-01

## 2025-04-02 ENCOUNTER — APPOINTMENT (OUTPATIENT)
Dept: GASTROENTEROLOGY | Facility: AMBULATORY MEDICAL SERVICES | Age: 60
End: 2025-04-02
Payer: COMMERCIAL

## 2025-04-02 PROCEDURE — 45378 DIAGNOSTIC COLONOSCOPY: CPT

## 2025-07-01 ENCOUNTER — APPOINTMENT (OUTPATIENT)
Dept: NEUROLOGY | Facility: CLINIC | Age: 60
End: 2025-07-01

## 2025-07-15 ENCOUNTER — NON-APPOINTMENT (OUTPATIENT)
Age: 60
End: 2025-07-15